# Patient Record
Sex: MALE | Race: WHITE | Employment: STUDENT | URBAN - METROPOLITAN AREA
[De-identification: names, ages, dates, MRNs, and addresses within clinical notes are randomized per-mention and may not be internally consistent; named-entity substitution may affect disease eponyms.]

---
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
## 2024-04-27 ENCOUNTER — HOSPITAL ENCOUNTER (EMERGENCY)
Age: 19
Discharge: HOME | End: 2024-04-27
Attending: EMERGENCY MEDICINE
Payer: COMMERCIAL

## 2024-04-27 VITALS
RESPIRATION RATE: 11 BRPM | WEIGHT: 150 LBS | DIASTOLIC BLOOD PRESSURE: 77 MMHG | SYSTOLIC BLOOD PRESSURE: 132 MMHG | OXYGEN SATURATION: 96 % | BODY MASS INDEX: 19.25 KG/M2 | HEIGHT: 74 IN | HEART RATE: 66 BPM | TEMPERATURE: 97.7 F

## 2024-04-27 DIAGNOSIS — F39 MOOD DISORDER (HCC): Primary | ICD-10-CM

## 2024-04-27 LAB
ALBUMIN SERPL-MCNC: 5 G/DL (ref 3.5–5.2)
ALP SERPL-CCNC: 70 U/L (ref 40–129)
ALT SERPL-CCNC: 11 U/L (ref 0–40)
AMPHET UR QL SCN: NEGATIVE
ANION GAP SERPL CALCULATED.3IONS-SCNC: 11 MMOL/L (ref 7–16)
APAP SERPL-MCNC: <5 UG/ML (ref 10–30)
AST SERPL-CCNC: 16 U/L (ref 0–39)
BARBITURATES UR QL SCN: NEGATIVE
BASOPHILS # BLD: 0.06 K/UL (ref 0–0.2)
BASOPHILS NFR BLD: 1 % (ref 0–2)
BENZODIAZ UR QL: NEGATIVE
BILIRUB SERPL-MCNC: 0.9 MG/DL (ref 0–1.2)
BUN SERPL-MCNC: 15 MG/DL (ref 6–20)
BUPRENORPHINE UR QL: NEGATIVE
CALCIUM SERPL-MCNC: 10.2 MG/DL (ref 8.6–10.2)
CANNABINOIDS UR QL SCN: POSITIVE
CHLORIDE SERPL-SCNC: 102 MMOL/L (ref 98–107)
CO2 SERPL-SCNC: 26 MMOL/L (ref 22–29)
COCAINE UR QL SCN: NEGATIVE
CREAT SERPL-MCNC: 1 MG/DL (ref 0.7–1.2)
EOSINOPHIL # BLD: 0.09 K/UL (ref 0.05–0.5)
EOSINOPHILS RELATIVE PERCENT: 1 % (ref 0–6)
ERYTHROCYTE [DISTWIDTH] IN BLOOD BY AUTOMATED COUNT: 13.4 % (ref 11.5–15)
ETHANOLAMINE SERPL-MCNC: <10 MG/DL
FENTANYL UR QL: NEGATIVE
GFR SERPL CREATININE-BSD FRML MDRD: >90 ML/MIN/1.73M2
GLUCOSE BLD-MCNC: 131 MG/DL (ref 74–99)
GLUCOSE SERPL-MCNC: 69 MG/DL (ref 74–99)
HCT VFR BLD AUTO: 44 % (ref 37–54)
HGB BLD-MCNC: 14.5 G/DL (ref 12.5–16.5)
IMM GRANULOCYTES # BLD AUTO: <0.03 K/UL (ref 0–0.58)
IMM GRANULOCYTES NFR BLD: 0 % (ref 0–5)
LYMPHOCYTES NFR BLD: 2.07 K/UL (ref 1.5–4)
LYMPHOCYTES RELATIVE PERCENT: 24 % (ref 20–42)
MCH RBC QN AUTO: 29.8 PG (ref 26–35)
MCHC RBC AUTO-ENTMCNC: 33 G/DL (ref 32–34.5)
MCV RBC AUTO: 90.3 FL (ref 80–99.9)
METHADONE UR QL: NEGATIVE
MONOCYTES NFR BLD: 0.68 K/UL (ref 0.1–0.95)
MONOCYTES NFR BLD: 8 % (ref 2–12)
NEUTROPHILS NFR BLD: 66 % (ref 43–80)
NEUTS SEG NFR BLD: 5.61 K/UL (ref 1.8–7.3)
OPIATES UR QL SCN: NEGATIVE
OXYCODONE UR QL SCN: POSITIVE
PCP UR QL SCN: NEGATIVE
PLATELET # BLD AUTO: 269 K/UL (ref 130–450)
PMV BLD AUTO: 9.5 FL (ref 7–12)
POTASSIUM SERPL-SCNC: 4.7 MMOL/L (ref 3.5–5)
PROT SERPL-MCNC: 7.5 G/DL (ref 6.4–8.3)
RBC # BLD AUTO: 4.87 M/UL (ref 3.8–5.8)
SALICYLATES SERPL-MCNC: <0.3 MG/DL (ref 0–30)
SODIUM SERPL-SCNC: 139 MMOL/L (ref 132–146)
TEST INFORMATION: ABNORMAL
TOXIC TRICYCLIC SC,BLOOD: NEGATIVE
WBC OTHER # BLD: 8.5 K/UL (ref 4.5–11.5)

## 2024-04-27 PROCEDURE — 82962 GLUCOSE BLOOD TEST: CPT

## 2024-04-27 PROCEDURE — 99284 EMERGENCY DEPT VISIT MOD MDM: CPT

## 2024-04-27 PROCEDURE — G0480 DRUG TEST DEF 1-7 CLASSES: HCPCS

## 2024-04-27 PROCEDURE — 80143 DRUG ASSAY ACETAMINOPHEN: CPT

## 2024-04-27 PROCEDURE — 80307 DRUG TEST PRSMV CHEM ANLYZR: CPT

## 2024-04-27 PROCEDURE — 80179 DRUG ASSAY SALICYLATE: CPT

## 2024-04-27 PROCEDURE — 85025 COMPLETE CBC W/AUTO DIFF WBC: CPT

## 2024-04-27 PROCEDURE — 80053 COMPREHEN METABOLIC PANEL: CPT

## 2024-04-27 ASSESSMENT — LIFESTYLE VARIABLES
HOW MANY STANDARD DRINKS CONTAINING ALCOHOL DO YOU HAVE ON A TYPICAL DAY: PATIENT DOES NOT DRINK
HOW OFTEN DO YOU HAVE A DRINK CONTAINING ALCOHOL: NEVER
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
## 2024-04-28 NOTE — DISCHARGE INSTRUCTIONS
Help Hotline 436 059-8297 or 1-833.605.2225 or 211   24 hour crisis line for the following counties  [facility]   [email or web address]    PEER WARM LINE: 1-376.807.4047  Monday through Friday 4pm to 8pm and Saturday 1pm-3pm   You can call during these times to talk with a peer support person as needed       [facility]  [facility] [facility] [employer])   Visit website for more information   (606) [phone]     [facility]  [address], [place] [address]  [phone]    [facility]  [address] [place] [address]  [phone]    [facility]  [address], [place] [address]  [phone]    [facility]  [address] [place] [address]  [phone]    [facility]  [address] [place] [address]  [phone]    [facility]  [address] [place] [address]  [phone]    [facility]  [address] [place] [address]  [phone]    [facility]  [address] [place] [address]  [phone]    [facility]  [address] [place] [address] [address], [place] [address]  [phone]    [facility]  [address] [place] [address]  [phone]    [facility]  [address] [place] [address]  [phone]    [facility]  [address], [place] [address]  [phone]    [facility].  [address] [place] [address]  [phone]    [facility]  [address], [place] [address]  [phone]    [facility]  [address], [place] [address] [phone]    [address] [address] (795)

## 2024-05-02 LAB
EKG ATRIAL RATE: 57 BPM
EKG P AXIS: 65 DEGREES
EKG P-R INTERVAL: 134 MS
EKG Q-T INTERVAL: 388 MS
EKG QRS DURATION: 102 MS
EKG QTC CALCULATION (BAZETT): 377 MS
EKG R AXIS: 97 DEGREES
EKG T AXIS: 59 DEGREES
EKG VENTRICULAR RATE: 57 BPM

## 2024-12-18 ENCOUNTER — HOSPITAL ENCOUNTER (EMERGENCY)
Age: 19
Discharge: HOME | End: 2024-12-18
Payer: COMMERCIAL

## 2024-12-18 VITALS
HEART RATE: 55 BPM | SYSTOLIC BLOOD PRESSURE: 113 MMHG | OXYGEN SATURATION: 99 % | DIASTOLIC BLOOD PRESSURE: 70 MMHG | TEMPERATURE: 98 F | RESPIRATION RATE: 16 BRPM | BODY MASS INDEX: 19.76 KG/M2 | WEIGHT: 154 LBS

## 2024-12-18 DIAGNOSIS — R10.13 ABDOMINAL PAIN, EPIGASTRIC: Primary | ICD-10-CM

## 2024-12-18 LAB
ALBUMIN SERPL-MCNC: 4.7 G/DL (ref 3.5–5.2)
ALP SERPL-CCNC: 67 U/L (ref 40–129)
ALT SERPL-CCNC: 10 U/L (ref 0–40)
ANION GAP SERPL CALCULATED.3IONS-SCNC: 10 MMOL/L (ref 7–16)
AST SERPL-CCNC: 15 U/L (ref 0–39)
BASOPHILS # BLD: 0.07 K/UL (ref 0–0.2)
BASOPHILS NFR BLD: 1 % (ref 0–2)
BILIRUB SERPL-MCNC: 0.9 MG/DL (ref 0–1.2)
BUN SERPL-MCNC: 11 MG/DL (ref 6–20)
CALCIUM SERPL-MCNC: 10.2 MG/DL (ref 8.6–10.2)
CHLORIDE SERPL-SCNC: 105 MMOL/L (ref 98–107)
CO2 SERPL-SCNC: 26 MMOL/L (ref 22–29)
CREAT SERPL-MCNC: 1.1 MG/DL (ref 0.7–1.2)
EOSINOPHIL # BLD: 0.2 K/UL (ref 0.05–0.5)
EOSINOPHILS RELATIVE PERCENT: 2 % (ref 0–6)
ERYTHROCYTE [DISTWIDTH] IN BLOOD BY AUTOMATED COUNT: 12.7 % (ref 11.5–15)
GFR, ESTIMATED: >90 ML/MIN/1.73M2
GLUCOSE SERPL-MCNC: 78 MG/DL (ref 74–99)
HCT VFR BLD AUTO: 42.4 % (ref 37–54)
HGB BLD-MCNC: 14.5 G/DL (ref 12.5–16.5)
IMM GRANULOCYTES # BLD AUTO: <0.03 K/UL (ref 0–0.58)
IMM GRANULOCYTES NFR BLD: 0 % (ref 0–5)
LACTATE BLDV-SCNC: 0.9 MMOL/L (ref 0.5–2.2)
LIPASE SERPL-CCNC: 85 U/L (ref 13–60)
LYMPHOCYTES NFR BLD: 2.91 K/UL (ref 1.5–4)
LYMPHOCYTES RELATIVE PERCENT: 34 % (ref 20–42)
MCH RBC QN AUTO: 29.8 PG (ref 26–35)
MCHC RBC AUTO-ENTMCNC: 34.2 G/DL (ref 32–34.5)
MCV RBC AUTO: 87.1 FL (ref 80–99.9)
MONOCYTES NFR BLD: 0.52 K/UL (ref 0.1–0.95)
MONOCYTES NFR BLD: 6 % (ref 2–12)
NEUTROPHILS NFR BLD: 56 % (ref 43–80)
NEUTS SEG NFR BLD: 4.78 K/UL (ref 1.8–7.3)
PLATELET # BLD AUTO: 241 K/UL (ref 130–450)
PMV BLD AUTO: 9.4 FL (ref 7–12)
POTASSIUM SERPL-SCNC: 4.2 MMOL/L (ref 3.5–5)
PROT SERPL-MCNC: 7.2 G/DL (ref 6.4–8.3)
RBC # BLD AUTO: 4.87 M/UL (ref 3.8–5.8)
SODIUM SERPL-SCNC: 141 MMOL/L (ref 132–146)
WBC OTHER # BLD: 8.5 K/UL (ref 4.5–11.5)

## 2024-12-18 PROCEDURE — 80053 COMPREHEN METABOLIC PANEL: CPT

## 2024-12-18 PROCEDURE — 85025 COMPLETE CBC W/AUTO DIFF WBC: CPT

## 2024-12-18 PROCEDURE — 6370000000 HC RX 637 (ALT 250 FOR IP): Performed by: PHYSICIAN ASSISTANT

## 2024-12-18 PROCEDURE — 83605 ASSAY OF LACTIC ACID: CPT

## 2024-12-18 PROCEDURE — 99283 EMERGENCY DEPT VISIT LOW MDM: CPT

## 2024-12-18 PROCEDURE — 83690 ASSAY OF LIPASE: CPT

## 2024-12-18 RX ORDER — PANTOPRAZOLE SODIUM 40 MG/1
40 TABLET, DELAYED RELEASE ORAL
Qty: 14 TABLET | Refills: 0 | Status: SHIPPED | OUTPATIENT
Start: 2024-12-18 | End: 2025-01-01

## 2024-12-18 RX ADMIN — LIDOCAINE HYDROCHLORIDE: 20 SOLUTION ORAL at 18:02

## 2024-12-18 ASSESSMENT — PAIN DESCRIPTION - DESCRIPTORS: DESCRIPTORS: DISCOMFORT;PRESSURE;TIGHTNESS

## 2024-12-18 ASSESSMENT — PAIN - FUNCTIONAL ASSESSMENT
PAIN_FUNCTIONAL_ASSESSMENT: 0-10
PAIN_FUNCTIONAL_ASSESSMENT: NONE - DENIES PAIN

## 2024-12-18 ASSESSMENT — PAIN SCALES - GENERAL: PAINLEVEL_OUTOF10: 3

## 2024-12-18 ASSESSMENT — PAIN DESCRIPTION - LOCATION: LOCATION: ABDOMEN

## 2024-12-18 ASSESSMENT — PAIN DESCRIPTION - PAIN TYPE: TYPE: ACUTE PAIN

## 2024-12-18 NOTE — ED PROVIDER NOTES
Independent XIN Visit.        [facility]  Department of Emergency Medicine   ED  Encounter Note  Admit Date/RoomTime: 2024  5:14 PM  ED Room:     NAME: Hanh Rey  : 2005  MRN: [id number]     Chief Complaint:  Abdominal Pain (Pt with epigastric pain for 1-2 months, seen at Urgent Care and sent here for CT due to concern for appendicitis)    History of Present Illness        Hanh Rey is a 19 y.o. old male who presents to the emergency department by private vehicle, for persistent aching pain in the epigastrium without radiation which began 1 year(s) prior to arrival with worsening pain over the past 2 months. There has been no similar episodes in the past.   Since onset the symptoms have been gradually worsening.  The pain is associated with intermittent loose stools without blood or tarry appearance and sometimes nausea.  The pain is aggravated by eating, drinking, and pressure on area of discomfort and relieved by none and nothing. There has been NO chest pain, shortness of breath, fever, vomiting, constipation, dark/black stools, blood in stool, urinary frequency, dysuria, hematuria, or urinary urgency.    Patient report that he was seen at [facility] urgent care prior to arrival and he was recommended to come to the ER to rule out \"cholecystitis or appendicitis\".  .  ROS   Pertinent positives and negatives are stated within HPI, all other systems reviewed and are negative.    Past Medical History:  has a past medical history of Elbow dislocation.    Surgical History:  has no past surgical history on file.    Social History:  reports that he has never smoked. He has never used smokeless tobacco. He reports that he does not currently use alcohol. He reports that he does not use drugs.    Family History: family history is not on file.     Allergies: Patient has no known allergies.    Physical Exam   Oxygen Saturation Interpretation: Normal.        ED Triage Vitals   BP